# Patient Record
Sex: MALE | URBAN - METROPOLITAN AREA
[De-identification: names, ages, dates, MRNs, and addresses within clinical notes are randomized per-mention and may not be internally consistent; named-entity substitution may affect disease eponyms.]

---

## 2021-12-30 ENCOUNTER — OFFICE VISIT (OUTPATIENT)
Dept: URBAN - METROPOLITAN AREA CLINIC 54 | Facility: CLINIC | Age: 66
End: 2021-12-30
Payer: COMMERCIAL

## 2021-12-30 DIAGNOSIS — Z96.1 PRESENCE OF PSEUDOPHAKIA: ICD-10-CM

## 2021-12-30 DIAGNOSIS — E11.3492 TYPE 2 DIAB W SEVERE NONPRLF DIAB RTNOP W/O MACULAR EDEMA, LEFT EYE: ICD-10-CM

## 2021-12-30 DIAGNOSIS — H35.372 PUCKERING OF MACULA, LEFT EYE: ICD-10-CM

## 2021-12-30 PROCEDURE — 99204 OFFICE O/P NEW MOD 45 MIN: CPT | Performed by: OPHTHALMOLOGY

## 2021-12-30 PROCEDURE — 92134 CPTRZ OPH DX IMG PST SGM RTA: CPT | Performed by: OPHTHALMOLOGY

## 2021-12-30 ASSESSMENT — INTRAOCULAR PRESSURE
OS: 14
OD: 15

## 2021-12-30 NOTE — IMPRESSION/PLAN
Impression: Puckering of macula, left eye: H35.372 Left. Plan: Examination and OCT reveals an ERM which is distorting the macular contour. The diagnosis, natural history, and prognosis of epiretinal membranes, as well as the risks and benefits of vitrectomy with membrane peeling versus observation were discussed at length. Given the patient's current visual acuity and minimal hindrance on activities of daily living, observation was recommended at this time. 

Would consider PPV/MP after OD stabilized

## 2021-12-30 NOTE — IMPRESSION/PLAN
Impression: Type 2 diab w severe nonprlf diabetic rtnop w macular edema, right eye: E11.3411 Right.
s/p Avastin OD x 3 weeks ago (has been getting monthly injections x 3 years) OCT:
OD: DME- SRF; no obvious traction OS: ERM Plan: The diagnosis and prognosis of diabetic macular edema, as well as the risks and benefits of various treatment options including focal/grid laser, steroids, Lucentis, Eylea and Avastin; along with the alternatives of observation or participation in a clinical trial, were discussed at length. The patient understands that treatment may not improve vision, but should reduce the risk of further visual loss. Given stability of vision and exam, pt elects to proceed with Ozurdex OD Will need prior auth.  

RTC 1 week Ozurdex OD (straight)

## 2021-12-30 NOTE — IMPRESSION/PLAN
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  Plan: Discussed good control of BP/BS

## 2022-01-06 ENCOUNTER — PROCEDURE (OUTPATIENT)
Dept: URBAN - METROPOLITAN AREA CLINIC 54 | Facility: CLINIC | Age: 67
End: 2022-01-06
Payer: COMMERCIAL

## 2022-01-06 PROCEDURE — 67028 INJECTION EYE DRUG: CPT | Performed by: OPHTHALMOLOGY

## 2022-01-06 ASSESSMENT — INTRAOCULAR PRESSURE
OS: 14
OD: 15

## 2022-02-03 ENCOUNTER — TESTING ONLY (OUTPATIENT)
Dept: URBAN - METROPOLITAN AREA CLINIC 54 | Facility: CLINIC | Age: 67
End: 2022-02-03
Payer: COMMERCIAL

## 2022-02-03 DIAGNOSIS — E11.3411 TYPE 2 DIAB W SEVERE NONPRLF DIABETIC RTNOP W MACULAR EDEMA, RIGHT EYE: Primary | ICD-10-CM

## 2022-02-03 PROCEDURE — 99211 OFF/OP EST MAY X REQ PHY/QHP: CPT | Performed by: OPHTHALMOLOGY

## 2022-02-03 ASSESSMENT — INTRAOCULAR PRESSURE
OD: 17
OS: 11

## 2022-02-03 NOTE — IMPRESSION/PLAN
Impression: Type 2 diab w severe nonprlf diabetic rtnop w macular edema, right eye: E11.3411 Right.
s/p Avastin OD x 3 weeks ago (has been getting monthly injections x 3 years)
-s/p Ozurdex OD 01/06/22 OCT:12/30/21 OD: DME- SRF; no obvious traction OS: ERM Plan: Patient returning for an IOP check. IOP stable today. Patient to call if vision worsens.  

RTC 3 months DFE/OCT OU re-eval Ozurdex

## 2022-05-05 ENCOUNTER — OFFICE VISIT (OUTPATIENT)
Dept: URBAN - METROPOLITAN AREA CLINIC 54 | Facility: CLINIC | Age: 67
End: 2022-05-05
Payer: COMMERCIAL

## 2022-05-05 DIAGNOSIS — E11.3411 TYPE 2 DIAB W SEVERE NONPRLF DIABETIC RTNOP W MACULAR EDEMA, RIGHT EYE: Primary | ICD-10-CM

## 2022-05-05 DIAGNOSIS — E11.3492 TYPE 2 DIAB W SEVERE NONPRLF DIAB RTNOP W/O MACULAR EDEMA, LEFT EYE: ICD-10-CM

## 2022-05-05 DIAGNOSIS — H35.372 PUCKERING OF MACULA, LEFT EYE: ICD-10-CM

## 2022-05-05 DIAGNOSIS — Z96.1 PRESENCE OF PSEUDOPHAKIA: ICD-10-CM

## 2022-05-05 PROCEDURE — 99214 OFFICE O/P EST MOD 30 MIN: CPT | Performed by: OPHTHALMOLOGY

## 2022-05-05 PROCEDURE — 67028 INJECTION EYE DRUG: CPT | Performed by: OPHTHALMOLOGY

## 2022-05-05 PROCEDURE — 92134 CPTRZ OPH DX IMG PST SGM RTA: CPT | Performed by: OPHTHALMOLOGY

## 2022-05-05 ASSESSMENT — INTRAOCULAR PRESSURE
OD: 11
OS: 14

## 2022-05-05 NOTE — IMPRESSION/PLAN
Impression: Type 2 diab w severe nonprlf diabetic rtnop w macular edema, right eye: E11.3411 Right.
s/p Avastin OD x 3 weeks ago (has been getting monthly injections x 3 years)
-Ozurdex 01/06/22 OCT: 05/05/22 OD: DME- improved OS: ERM Plan: The diagnosis and prognosis of diabetic macular edema, as well as the risks and benefits of various treatment options including focal/grid laser, steroids, Lucentis, Eylea and Avastin; along with the alternatives of observation or participation in a clinical trial, were discussed at length. The patient understands that treatment may not improve vision, but should reduce the risk of further visual loss.  

Given stability of vision and exam, pt elects to proceed with Ozurdex OD

RTC 4 weeks IOP check

## 2022-05-05 NOTE — IMPRESSION/PLAN
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  Plan: Discussed good control of BP/BS

## 2022-05-05 NOTE — IMPRESSION/PLAN
Impression: Puckering of macula, left eye: H35.372 Left. Plan: Examination and OCT reveals an ERM which is distorting the macular contour. The diagnosis, natural history, and prognosis of epiretinal membranes, as well as the risks and benefits of vitrectomy with membrane peeling versus observation were discussed at length. Given the patient's current visual acuity and minimal hindrance on activities of daily living, observation was recommended at this time. Discussed option of PPV/MP OS- pt would like to defer for now as he has other active health issues.

## 2022-06-02 ENCOUNTER — TESTING ONLY (OUTPATIENT)
Dept: URBAN - METROPOLITAN AREA CLINIC 54 | Facility: CLINIC | Age: 67
End: 2022-06-02
Payer: COMMERCIAL

## 2022-06-02 DIAGNOSIS — E11.3411 TYPE 2 DIAB W SEVERE NONPRLF DIABETIC RTNOP W MACULAR EDEMA, RIGHT EYE: Primary | ICD-10-CM

## 2022-06-02 PROCEDURE — 99211 OFF/OP EST MAY X REQ PHY/QHP: CPT | Performed by: OPHTHALMOLOGY

## 2022-06-02 ASSESSMENT — INTRAOCULAR PRESSURE
OD: 17
OS: 9

## 2022-08-04 ENCOUNTER — OFFICE VISIT (OUTPATIENT)
Dept: URBAN - METROPOLITAN AREA CLINIC 54 | Facility: CLINIC | Age: 67
End: 2022-08-04
Payer: COMMERCIAL

## 2022-08-04 DIAGNOSIS — E11.3411 TYPE 2 DIAB W SEVERE NONPRLF DIABETIC RTNOP W MACULAR EDEMA, RIGHT EYE: Primary | ICD-10-CM

## 2022-08-04 DIAGNOSIS — H35.372 PUCKERING OF MACULA, LEFT EYE: ICD-10-CM

## 2022-08-04 DIAGNOSIS — E11.3492 TYPE 2 DIAB W SEVERE NONPRLF DIAB RTNOP W/O MACULAR EDEMA, LEFT EYE: ICD-10-CM

## 2022-08-04 DIAGNOSIS — Z96.1 PRESENCE OF PSEUDOPHAKIA: ICD-10-CM

## 2022-08-04 PROCEDURE — 92134 CPTRZ OPH DX IMG PST SGM RTA: CPT | Performed by: OPHTHALMOLOGY

## 2022-08-04 PROCEDURE — 99214 OFFICE O/P EST MOD 30 MIN: CPT | Performed by: OPHTHALMOLOGY

## 2022-08-04 PROCEDURE — 67028 INJECTION EYE DRUG: CPT | Performed by: OPHTHALMOLOGY

## 2022-08-04 ASSESSMENT — INTRAOCULAR PRESSURE
OD: 16
OS: 9

## 2022-08-04 NOTE — IMPRESSION/PLAN
Impression: Type 2 diab w severe nonprlf diabetic rtnop w macular edema, right eye: E11.3411 Right.
s/p Avastin OD (has been getting monthly injections x 3 years)
-Ozurdex 05/05/22 OCT: 08/04/22 OD: DME- improved OS: ERM Plan: The diagnosis and prognosis of diabetic macular edema, as well as the risks and benefits of various treatment options including focal/grid laser, steroids, Lucentis, Eylea and Avastin; along with the alternatives of observation or participation in a clinical trial, were discussed at length. The patient understands that treatment may not improve vision, but should reduce the risk of further visual loss.  

Given stability of vision and exam, pt elects to proceed with Ozurdex OD

RTC 4 weeks IOP check then 3 month DFE/OCT OU re-omar [place] vs Ozurdex OD

## 2022-09-01 ENCOUNTER — TESTING ONLY (OUTPATIENT)
Dept: URBAN - METROPOLITAN AREA CLINIC 54 | Facility: CLINIC | Age: 67
End: 2022-09-01
Payer: COMMERCIAL

## 2022-09-01 DIAGNOSIS — E11.3492 TYPE 2 DIAB W SEVERE NONPRLF DIAB RTNOP W/O MACULAR EDEMA, LEFT EYE: Primary | ICD-10-CM

## 2022-09-01 PROCEDURE — 99211 OFF/OP EST MAY X REQ PHY/QHP: CPT | Performed by: OPHTHALMOLOGY

## 2022-09-01 ASSESSMENT — INTRAOCULAR PRESSURE
OS: 14
OD: 19

## 2022-11-03 ENCOUNTER — OFFICE VISIT (OUTPATIENT)
Dept: URBAN - METROPOLITAN AREA CLINIC 54 | Facility: CLINIC | Age: 67
End: 2022-11-03
Payer: COMMERCIAL

## 2022-11-03 DIAGNOSIS — E11.3492 TYPE 2 DIAB W SEVERE NONPRLF DIAB RTNOP W/O MACULAR EDEMA, LEFT EYE: ICD-10-CM

## 2022-11-03 DIAGNOSIS — H35.372 PUCKERING OF MACULA, LEFT EYE: ICD-10-CM

## 2022-11-03 DIAGNOSIS — E11.3411 TYPE 2 DIAB W SEVERE NONPRLF DIABETIC RTNOP W MACULAR EDEMA, RIGHT EYE: Primary | ICD-10-CM

## 2022-11-03 PROCEDURE — 99214 OFFICE O/P EST MOD 30 MIN: CPT | Performed by: OPHTHALMOLOGY

## 2022-11-03 PROCEDURE — 67028 INJECTION EYE DRUG: CPT | Performed by: OPHTHALMOLOGY

## 2022-11-03 PROCEDURE — 92134 CPTRZ OPH DX IMG PST SGM RTA: CPT | Performed by: OPHTHALMOLOGY

## 2022-11-03 ASSESSMENT — INTRAOCULAR PRESSURE
OD: 18
OS: 10

## 2022-11-03 NOTE — IMPRESSION/PLAN
Impression: Type 2 diab w severe nonprlf diab rtnop w/o macular edema, left eye: A16.1322 Left.  Plan: Discussed good control of BP/BS

## 2022-11-03 NOTE — IMPRESSION/PLAN
Impression: Type 2 diab w severe nonprlf diabetic rtnop w macular edema, right eye: E11.3411 Right.
s/p Avastin OD (has been getting monthly injections x 3 years)
-Ozurdex 8/4/2022 OCT: 11/3/2022 OD: DME- improved OS: ERM Plan: The diagnosis and prognosis of diabetic macular edema, as well as the risks and benefits of various treatment options including focal/grid laser, steroids, Lucentis, Eylea and Avastin; along with the alternatives of observation or participation in a clinical trial, were discussed at length. The patient understands that treatment may not improve vision, but should reduce the risk of further visual loss.  

Given stability of vision and exam, pt elects to proceed with Iluvein OD

RTC 4 weeks IOP check then 3 month DFE/OCT OU re-eval Windy vs Ozurdex OD

## 2023-03-02 ENCOUNTER — OFFICE VISIT (OUTPATIENT)
Dept: URBAN - METROPOLITAN AREA CLINIC 54 | Facility: CLINIC | Age: 68
End: 2023-03-02
Payer: COMMERCIAL

## 2023-03-02 DIAGNOSIS — E11.3411 TYPE 2 DIAB W SEVERE NONPRLF DIABETIC RTNOP W MACULAR EDEMA, RIGHT EYE: Primary | ICD-10-CM

## 2023-03-02 DIAGNOSIS — E11.3492 TYPE 2 DIAB W SEVERE NONPRLF DIAB RTNOP W/O MACULAR EDEMA, LEFT EYE: ICD-10-CM

## 2023-03-02 DIAGNOSIS — H35.372 PUCKERING OF MACULA, LEFT EYE: ICD-10-CM

## 2023-03-02 PROCEDURE — 92134 CPTRZ OPH DX IMG PST SGM RTA: CPT | Performed by: OPHTHALMOLOGY

## 2023-03-02 PROCEDURE — 99213 OFFICE O/P EST LOW 20 MIN: CPT | Performed by: OPHTHALMOLOGY

## 2023-03-02 ASSESSMENT — INTRAOCULAR PRESSURE
OD: 14
OS: 12

## 2023-03-02 NOTE — IMPRESSION/PLAN
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  Plan: Discussed good control of BP/BS

## 2023-03-02 NOTE — IMPRESSION/PLAN
Impression: Type 2 diab w severe nonprlf diabetic rtnop w macular edema, right eye: E11.3411 Right.
s/p Avastin OD (has been getting monthly injections x 3 years)
-Ozurdex 8/4/2022
-s/p Iluvein 11/03/22 OCT: 03/02/23 OD: DME- improved OS: ERM Plan: The diagnosis and prognosis of diabetic macular edema, as well as the risks and benefits of various treatment options including focal/grid laser, steroids, Lucentis, Eylea and Avastin; along with the alternatives of observation or participation in a clinical trial, were discussed at length. The patient understands that treatment may not improve vision, but should reduce the risk of further visual loss.  

Given stability of vision and exam, pt elects to proceed with observation OD

RTC  3 month DFE/OCT OU re-eval Windy vs Ozurdex OD

## 2023-04-16 NOTE — IMPRESSION/PLAN
Impression: Type 2 diab w severe nonprlf diab rtnop w/o macular edema, left eye: V58.1355 Left.  Plan: Discussed good control of BP/BS 4 = No assist / stand by assistance

## 2023-05-25 ENCOUNTER — OFFICE VISIT (OUTPATIENT)
Dept: URBAN - METROPOLITAN AREA CLINIC 54 | Facility: CLINIC | Age: 68
End: 2023-05-25
Payer: COMMERCIAL

## 2023-05-25 DIAGNOSIS — E11.3411 TYPE 2 DIAB W SEVERE NONPRLF DIABETIC RTNOP W MACULAR EDEMA, RIGHT EYE: Primary | ICD-10-CM

## 2023-05-25 DIAGNOSIS — H35.372 PUCKERING OF MACULA, LEFT EYE: ICD-10-CM

## 2023-05-25 DIAGNOSIS — E11.3492 TYPE 2 DIAB W SEVERE NONPRLF DIAB RTNOP W/O MACULAR EDEMA, LEFT EYE: ICD-10-CM

## 2023-05-25 PROCEDURE — 99213 OFFICE O/P EST LOW 20 MIN: CPT | Performed by: OPHTHALMOLOGY

## 2023-05-25 PROCEDURE — 92134 CPTRZ OPH DX IMG PST SGM RTA: CPT | Performed by: OPHTHALMOLOGY

## 2023-05-25 ASSESSMENT — INTRAOCULAR PRESSURE
OS: 12
OD: 13

## 2023-05-25 NOTE — IMPRESSION/PLAN
Impression: Type 2 diab w severe nonprlf diab rtnop w/o macular edema, left eye: X85.6676 Left.  Plan: Discussed good control of BP/BS

## 2023-05-25 NOTE — IMPRESSION/PLAN
Impression: Type 2 diab w severe nonprlf diabetic rtnop w macular edema, right eye: E11.3411 Right.
s/p Avastin OD (has been getting monthly injections x 3 years)
-Ozurdex 8/4/2022
-s/p Iluvein 11/03/22 OCT: 05/25/23 OD: DME- improved OS: ERM Plan: The diagnosis and prognosis of diabetic macular edema, as well as the risks and benefits of various treatment options including focal/grid laser, steroids, Lucentis, Eylea and Avastin; along with the alternatives of observation or participation in a clinical trial, were discussed at length. The patient understands that treatment may not improve vision, but should reduce the risk of further visual loss.  

Given stability of vision and exam, pt elects to proceed with observation OD

RTC  3 month DFE/OCT OU re-eval Windy vs Ozurdex OD

## 2023-08-24 ENCOUNTER — OFFICE VISIT (OUTPATIENT)
Dept: URBAN - METROPOLITAN AREA CLINIC 54 | Facility: CLINIC | Age: 68
End: 2023-08-24
Payer: COMMERCIAL

## 2023-08-24 DIAGNOSIS — E11.3492 TYPE 2 DIAB W SEVERE NONPRLF DIAB RTNOP W/O MACULAR EDEMA, LEFT EYE: ICD-10-CM

## 2023-08-24 DIAGNOSIS — H35.372 PUCKERING OF MACULA, LEFT EYE: ICD-10-CM

## 2023-08-24 DIAGNOSIS — E11.3411 TYPE 2 DIAB W SEVERE NONPRLF DIABETIC RTNOP W MACULAR EDEMA, RIGHT EYE: Primary | ICD-10-CM

## 2023-08-24 PROCEDURE — 92134 CPTRZ OPH DX IMG PST SGM RTA: CPT | Performed by: OPHTHALMOLOGY

## 2023-08-24 PROCEDURE — 99213 OFFICE O/P EST LOW 20 MIN: CPT | Performed by: OPHTHALMOLOGY

## 2023-08-24 ASSESSMENT — INTRAOCULAR PRESSURE
OD: 15
OS: 12